# Patient Record
Sex: MALE | Race: OTHER | Employment: FULL TIME | URBAN - METROPOLITAN AREA
[De-identification: names, ages, dates, MRNs, and addresses within clinical notes are randomized per-mention and may not be internally consistent; named-entity substitution may affect disease eponyms.]

---

## 2017-10-07 ENCOUNTER — HOSPITAL ENCOUNTER (EMERGENCY)
Facility: HOSPITAL | Age: 47
Discharge: HOME | End: 2017-10-07
Attending: EMERGENCY MEDICINE
Payer: COMMERCIAL

## 2017-10-07 VITALS
TEMPERATURE: 98 F | BODY MASS INDEX: 29.82 KG/M2 | RESPIRATION RATE: 20 BRPM | HEART RATE: 66 BPM | OXYGEN SATURATION: 98 % | SYSTOLIC BLOOD PRESSURE: 138 MMHG | WEIGHT: 190 LBS | DIASTOLIC BLOOD PRESSURE: 70 MMHG | HEIGHT: 67 IN

## 2017-10-07 DIAGNOSIS — S01.512A INTRAORAL LACERATION, INITIAL ENCOUNTER: Primary | ICD-10-CM

## 2017-10-07 PROCEDURE — 12011 RPR F/E/E/N/L/M 2.5 CM/<: CPT

## 2017-10-07 PROCEDURE — 99283 EMERGENCY DEPT VISIT LOW MDM: CPT
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
## (undated) NOTE — ED AVS SNAPSHOT
Suleimanhubert Hussein   MRN: [id number]    Department:  [facility] Emergency Department   Date of Visit:  10/7/2017           Disclosure     Insurance plans vary and the physician(s) referred by the ER may not be covered by your plan.  Please contact y CARE PHYSICIAN AT ONCE OR RETURN IMMEDIATELY TO THE EMERGENCY DEPARTMENT. If you have been prescribed any medication(s), please fill your prescription right away and begin taking the medication(s) as directed.   If you believe that any of the medications